# Patient Record
Sex: FEMALE | Race: BLACK OR AFRICAN AMERICAN
[De-identification: names, ages, dates, MRNs, and addresses within clinical notes are randomized per-mention and may not be internally consistent; named-entity substitution may affect disease eponyms.]

---

## 2017-01-13 ENCOUNTER — HOSPITAL ENCOUNTER (EMERGENCY)
Dept: HOSPITAL 17 - NEPA | Age: 22
Discharge: HOME | End: 2017-01-13
Payer: COMMERCIAL

## 2017-01-13 VITALS
DIASTOLIC BLOOD PRESSURE: 70 MMHG | HEART RATE: 78 BPM | OXYGEN SATURATION: 100 % | TEMPERATURE: 97.8 F | SYSTOLIC BLOOD PRESSURE: 124 MMHG | RESPIRATION RATE: 16 BRPM

## 2017-01-13 VITALS
HEART RATE: 75 BPM | SYSTOLIC BLOOD PRESSURE: 131 MMHG | DIASTOLIC BLOOD PRESSURE: 59 MMHG | OXYGEN SATURATION: 99 % | RESPIRATION RATE: 16 BRPM

## 2017-01-13 VITALS — HEIGHT: 61 IN | BODY MASS INDEX: 19.98 KG/M2 | WEIGHT: 105.82 LBS

## 2017-01-13 DIAGNOSIS — N39.0: Primary | ICD-10-CM

## 2017-01-13 DIAGNOSIS — B96.20: ICD-10-CM

## 2017-01-13 LAB
BACTERIA #/AREA URNS HPF: (no result) /HPF
COLOR UR: YELLOW
COMMENT (UR): (no result)
CULTURE IF INDICATED: (no result)
GLUCOSE UR STRIP-MCNC: (no result) MG/DL
HGB UR QL STRIP: (no result)
KETONES UR STRIP-MCNC: (no result) MG/DL
MUCOUS THREADS #/AREA URNS LPF: (no result) /LPF
NITRITE UR QL STRIP: (no result)
SP GR UR STRIP: 1.02 (ref 1–1.03)
SQUAMOUS #/AREA URNS HPF: 2 /HPF (ref 0–5)

## 2017-01-13 PROCEDURE — 81001 URINALYSIS AUTO W/SCOPE: CPT

## 2017-01-13 PROCEDURE — 87186 SC STD MICRODIL/AGAR DIL: CPT

## 2017-01-13 PROCEDURE — 84703 CHORIONIC GONADOTROPIN ASSAY: CPT

## 2017-01-13 PROCEDURE — 87077 CULTURE AEROBIC IDENTIFY: CPT

## 2017-01-13 PROCEDURE — 87086 URINE CULTURE/COLONY COUNT: CPT

## 2017-01-13 PROCEDURE — 99283 EMERGENCY DEPT VISIT LOW MDM: CPT

## 2017-01-13 NOTE — PD
HPI


Chief Complaint:   Complaint


Time Seen by Provider:  10:00


Travel History


International Travel<30 days:  No


Contact w/Intl Traveler<30days:  No


Traveled to known affect area:  No





History of Present Illness


HPI


Patient is a 21-year-old female who presents to emergency room with complaints 

of possible urinary tract infection.  She reports that for the past 4 days, she 

has had increased dysuria, urinary urgency and frequency.  Patient reports that 

she try to flush out her kidneys by drinking plenty of fluids, reports that she 

is still symptomatic.  Patient reports that symptoms are worse today, and 

reports increased dysuria with urgency today.  Patient reports that she has had 

history of urinary tract infections in the past, patient reports the symptoms 

feel exactly the same as when she was briefly diagnosed with urinary tract 

infection.  Patient with no fevers or chills.  Patient with no abdominal pain.  

Patient with no nausea or vomiting.  Patient denies vaginal discharge or 

bleeding this time.





PFSH


Past Medical History


Medical History:  Denies Significant Hx


Asthma:  Yes


Diminished Hearing:  No


Respiratory:  Yes (ASTHMA)


Tetanus Vaccination:  < 5 Years


Pregnant?:  Unknown


LMP:  UNKNOWN


:  0





Past Surgical History


Surgical History:  No Previous Surgery





Family History


Family History:  Negative





Social History


Alcohol Use:  No


Tobacco Use:  No


Substance Use:  No





Allergies-Medications


(Allergen,Severity, Reaction):  


Coded Allergies:  


     No Known Allergies (Unverified , 17)


Reported Meds & Prescriptions





Reported Meds & Active Scripts


Active


No Active Prescriptions or Reported Medications    








Review of Systems


General / Constitutional:  No: Fever


Eyes:  No: Visual changes


HENT:  No: Headaches


Cardiovascular:  No: Chest Pain or Discomfort


Respiratory:  No: Shortness of Breath


Gastrointestinal:  No: Nausea, Vomiting, Diarrhea, Abdominal Pain


Genitourinary:  Positive: Urgency, Frequency, Dysuria, Hematuria,  No: Hesitancy

, Pelvic Pain, Flank Pain, Discharge, Vaginal Bleeding


Musculoskeletal:  No: Pain


Skin:  No Rash


Neurologic:  No: Weakness


Psychiatric:  No: Depression


Endocrine:  No: Polydipsia


Hematologic/Lymphatic:  No: Easy Bruising





Physical Exam


Narrative


GENERAL: Well-appearing, no acute distress


SKIN: Warm and dry.


HEAD: Atraumatic. Normocephalic. 


ENT: Mucous membranes pink and moist.


NECK: Trachea midline. No JVD. 


CARDIOVASCULAR: Regular rate and rhythm.  No murmur appreciated.


RESPIRATORY: No accessory muscle use. Clear to auscultation. Breath sounds 

equal bilaterally. 


GASTROINTESTINAL: Abdomen soft, non-tender, nondistended. Hepatic and splenic 

margins not palpable. 


MUSCULOSKELETAL: No obvious deformities. No clubbing.  No cyanosis.  No edema.





Data


Data


Last Documented VS





Vital Signs








  Date Time  Temp Pulse Resp B/P Pulse Ox O2 Delivery O2 Flow Rate FiO2


 


17 09:51  75 16 131/59 99   


 


17 09:29 97.8     Room Air  








Orders





 Urinalysis - C+S If Indicated (17 09:53)


Ed Urine Pregnancytest Poc (17 09:53)


Urine Culture (17 10:10)





Labs








 Laboratory Tests








Test 17





 10:10


 


Urine Color YELLOW 


 


Urine Turbidity HAZY 


 


Urine pH 6.5 


 


Urine Specific Gravity 1.018 


 


Urine Protein 30 mg/dL


 


Urine Glucose (UA) NEG mg/dL


 


Urine Ketones NEG mg/dL


 


Urine Occult Blood LARGE 


 


Urine Nitrite NEG 


 


Urine Bilirubin NEG 


 


Urine Urobilinogen LESS THAN 2.0





 MG/DL


 


Urine Leukocyte Esterase LARGE 


 


Urine RBC  /hpf


 


Urine WBC  /hpf


 


Urine Squamous Epithelial 2 /hpf





Cells 


 


Urine Bacteria RARE /hpf


 


Urine Mucus FEW /lpf


 


Microscopic Urinalysis Comment CULTURE





 INDICATED














MDM


Medical Decision Making


Medical Screen Exam Complete:  Yes


Emergency Medical Condition:  Yes


Interpretation(s)





Vital Signs








  Date Time  Temp Pulse Resp B/P Pulse Ox O2 Delivery O2 Flow Rate FiO2


 


17 09:51  75 16 131/59 99   


 


17 09:29 97.8 78 16 124/70 100 Room Air  








Differential Diagnosis


UTI, cervicitis


Narrative Course


21-year-old female who presents to emergency room for evaluation of possible 

UTI.  Patient reports that she has been having increased dysuria, urinary 

urgency and frequency for the past 4 days.  Patient reports that symptoms have 

been getting worse over the past couple days, patient reports that symptoms 

feel similar to when she's had urinary tract infections in the past.  Patient 

here to see she has a urinary tract infection.  Patient with no vaginal 

discharge or vaginal pain or abdominal pain at this time.





UA ordered for evaluation of possible urinary tract infection.





Patient with UTI and UA, will start patient on Macrobid.  Discussed with 

patient need to follow up with cultures from today.  Patient will follow-up 

with primary doctor and return to ER as needed.





Diagnosis





 Primary Impression:  


 UTI (urinary tract infection)


 Qualified Code:  N30.01 - Acute cystitis with hematuria


Patient Instructions:  General Instructions





***Additional Instructions:


Please take all antibiotics as prescribed.





Please follow-up with primary care doctor and return to ER as needed





Please follow-up with all CULTURES from today.


***Med/Other Pt SpecificInfo:  Prescription(s) given


Scripts


Nitrofurantoin Monohydrate Macrocrystals (Macrobid)100 Mg Dpi504 Mg PO BID  10 

Days  Ref 0


   Prov:Katty Patel DO         17


Disposition:  01 DISCHARGE HOME


Condition:  Stable








Katty Patel DO 2017 10:07

## 2017-01-17 ENCOUNTER — HOSPITAL ENCOUNTER (OUTPATIENT)
Dept: HOSPITAL 17 - NEPA | Age: 22
Setting detail: OBSERVATION
LOS: 1 days | Discharge: HOME | End: 2017-01-18
Attending: HOSPITALIST | Admitting: HOSPITALIST
Payer: COMMERCIAL

## 2017-01-17 VITALS — BODY MASS INDEX: 19.98 KG/M2 | WEIGHT: 105.82 LBS | HEIGHT: 61 IN

## 2017-01-17 VITALS
DIASTOLIC BLOOD PRESSURE: 62 MMHG | TEMPERATURE: 98.4 F | OXYGEN SATURATION: 100 % | HEART RATE: 127 BPM | RESPIRATION RATE: 22 BRPM | SYSTOLIC BLOOD PRESSURE: 109 MMHG

## 2017-01-17 VITALS — OXYGEN SATURATION: 98 %

## 2017-01-17 VITALS
RESPIRATION RATE: 20 BRPM | TEMPERATURE: 98.9 F | SYSTOLIC BLOOD PRESSURE: 111 MMHG | OXYGEN SATURATION: 100 % | DIASTOLIC BLOOD PRESSURE: 61 MMHG | HEART RATE: 140 BPM

## 2017-01-17 VITALS — HEART RATE: 108 BPM | OXYGEN SATURATION: 97 % | RESPIRATION RATE: 34 BRPM

## 2017-01-17 VITALS — OXYGEN SATURATION: 100 %

## 2017-01-17 DIAGNOSIS — R11.10: ICD-10-CM

## 2017-01-17 DIAGNOSIS — E86.0: ICD-10-CM

## 2017-01-17 DIAGNOSIS — J02.9: ICD-10-CM

## 2017-01-17 DIAGNOSIS — R50.9: ICD-10-CM

## 2017-01-17 DIAGNOSIS — R00.0: ICD-10-CM

## 2017-01-17 DIAGNOSIS — R73.9: ICD-10-CM

## 2017-01-17 DIAGNOSIS — R05: ICD-10-CM

## 2017-01-17 DIAGNOSIS — J45.901: Primary | ICD-10-CM

## 2017-01-17 LAB
ALP SERPL-CCNC: 65 U/L (ref 45–117)
ALT SERPL-CCNC: 26 U/L (ref 10–53)
ANION GAP SERPL CALC-SCNC: 9 MEQ/L (ref 5–15)
AST SERPL-CCNC: 20 U/L (ref 15–37)
BASOPHILS # BLD AUTO: 0 TH/MM3 (ref 0–0.2)
BASOPHILS NFR BLD: 0.7 % (ref 0–2)
BILIRUB SERPL-MCNC: 0.7 MG/DL (ref 0.2–1)
BLOOD GAS VENOUS BASE EXCESS: -3.5 MMOL/L (ref -2–2)
BLOOD GAS VENOUS HCO3: 19 MMOL/L (ref 22–26)
BLOOD GAS VENOUS PCO2: 26 MMHG (ref 44–48)
BLOOD GAS VENOUS PH: 7.49 (ref 7.36–7.4)
BLOOD GAS VENOUS PO2: 47 MMHG (ref 35–40)
BUN SERPL-MCNC: 9 MG/DL (ref 7–18)
CD3+CD4+ CELLS # BLD: 82 % (ref 70–76)
CHLORIDE SERPL-SCNC: 104 MEQ/L (ref 98–107)
CRITICAL VALUE: YES
EOSINOPHIL # BLD: 0.5 TH/MM3 (ref 0–0.4)
EOSINOPHIL NFR BLD: 6.6 % (ref 0–4)
ERYTHROCYTE [DISTWIDTH] IN BLOOD BY AUTOMATED COUNT: 12.9 % (ref 11.6–17.2)
GFR SERPLBLD BASED ON 1.73 SQ M-ARVRAT: 94 ML/MIN (ref 89–?)
HCO3 BLD-SCNC: 22.6 MEQ/L (ref 21–32)
HCT VFR BLD CALC: 40.6 % (ref 35–46)
HEMO FLAGS: (no result)
LYMPHOCYTES # BLD AUTO: 1.7 TH/MM3 (ref 1–4.8)
LYMPHOCYTES NFR BLD AUTO: 25.2 % (ref 9–44)
MCH RBC QN AUTO: 30.2 PG (ref 27–34)
MCHC RBC AUTO-ENTMCNC: 34.2 % (ref 32–36)
MCV RBC AUTO: 88.3 FL (ref 80–100)
METHAMPHET UR QL: 15.6 VOL % (ref 9–17)
MONOCYTES NFR BLD: 13 % (ref 0–8)
NEUTROPHILS # BLD AUTO: 3.8 TH/MM3 (ref 1.8–7.7)
NEUTROPHILS NFR BLD AUTO: 54.5 % (ref 16–70)
O2/TOTAL GAS SETTING VFR VENT: 21 %
OXYGEN DEVICE: (no result)
PLATELET # BLD: 277 TH/MM3 (ref 150–450)
POTASSIUM SERPL-SCNC: 3.8 MEQ/L (ref 3.5–5.1)
RBC # BLD AUTO: 4.6 MIL/MM3 (ref 4–5.3)
SODIUM SERPL-SCNC: 136 MEQ/L (ref 136–145)
STAT: NO
TEMP CORR TO: 98.6
WBC # BLD AUTO: 6.9 TH/MM3 (ref 4–11)

## 2017-01-17 PROCEDURE — 94640 AIRWAY INHALATION TREATMENT: CPT

## 2017-01-17 PROCEDURE — 82805 BLOOD GASES W/O2 SATURATION: CPT

## 2017-01-17 PROCEDURE — 85379 FIBRIN DEGRADATION QUANT: CPT

## 2017-01-17 PROCEDURE — 80048 BASIC METABOLIC PNL TOTAL CA: CPT

## 2017-01-17 PROCEDURE — G0378 HOSPITAL OBSERVATION PER HR: HCPCS

## 2017-01-17 PROCEDURE — 96374 THER/PROPH/DIAG INJ IV PUSH: CPT

## 2017-01-17 PROCEDURE — 80053 COMPREHEN METABOLIC PANEL: CPT

## 2017-01-17 PROCEDURE — 99285 EMERGENCY DEPT VISIT HI MDM: CPT

## 2017-01-17 PROCEDURE — 71010: CPT

## 2017-01-17 PROCEDURE — 85025 COMPLETE CBC W/AUTO DIFF WBC: CPT

## 2017-01-17 PROCEDURE — 96375 TX/PRO/DX INJ NEW DRUG ADDON: CPT

## 2017-01-17 PROCEDURE — 96361 HYDRATE IV INFUSION ADD-ON: CPT

## 2017-01-17 PROCEDURE — 94664 DEMO&/EVAL PT USE INHALER: CPT

## 2017-01-17 RX ADMIN — IPRATROPIUM BROMIDE AND ALBUTEROL SULFATE SCH AMPULE: .5; 3 SOLUTION RESPIRATORY (INHALATION) at 18:45

## 2017-01-17 RX ADMIN — IPRATROPIUM BROMIDE AND ALBUTEROL SULFATE SCH AMPULE: .5; 3 SOLUTION RESPIRATORY (INHALATION) at 18:44

## 2017-01-17 RX ADMIN — IPRATROPIUM BROMIDE AND ALBUTEROL SULFATE SCH AMPULE: .5; 3 SOLUTION RESPIRATORY (INHALATION) at 18:42

## 2017-01-17 NOTE — PD
HPI


Chief Complaint:  Respiratory Distress


Time Seen by Provider:  18:37


Travel History


International Travel<30 days:  No


Contact w/Intl Traveler<30days:  No


Traveled to known affect area:  No





History of Present Illness


HPI


Patient is a 21-year-old female with a history of asthma presents to the 

emergency department with shortness of breath.  Patient states she's been 

increasing shortness of breath over the past 2 days and is at a history of 

upper respiratory symptoms including cough and congestion.  Denies any fevers.  

Patient does state that she gets Depo-Provera shot for birth control.  Denies 

any long trips recently.  On arrival patient is able to speak in only one or 2 

words at a time very dyspneic appearing.





PFSH


Past Medical History


Asthma:  Yes


Diminished Hearing:  No


Respiratory:  Yes (ASTHMA)


Tetanus Vaccination:  < 5 Years


Influenza Vaccination:  Yes


Pregnant?:  Not Pregnant


LMP:  DEPO


:  0





Past Surgical History


Surgical History:  No Previous Surgery





Social History


Alcohol Use:  No


Tobacco Use:  No


Substance Use:  No





Allergies-Medications


(Allergen,Severity, Reaction):  


Coded Allergies:  


     No Known Allergies (Unverified , 17)


Reported Meds & Prescriptions





Reported Meds & Active Scripts


Active


Macrobid (Nitrofurantoin Monoh/Nitrofur Macro) 100 Mg Cap 100 Mg PO BID 10 Days








Review of Systems


Except as stated in HPI:  all other systems reviewed are Neg





Physical Exam


Narrative


GENERAL: Well-developed well-nourished in obvious respiratory distress.  She is 

however protecting her own airway.  Mostly nods and shakes her head stance are 

questions.


SKIN: Warm and dry.


HEAD: Atraumatic. Normocephalic. 


EYES: Pupils equal and round. No scleral icterus. No injection or drainage. 


ENT: No nasal bleeding or discharge.  Mucous membranes pink and moist.


NECK: Trachea midline. No JVD. 


CARDIOVASCULAR: Regular rate with tachycardia..  No murmur appreciated.  2+ 

bilateral equal pulses in all 4 cavities.


RESPIRATORY: Intercostal and supraclavicular retractions, very faint breath 

sounds throughout her lung fields, inspiratory and expiratory wheezing.


GASTROINTESTINAL: Abdomen soft, non-tender, nondistended. Hepatic and splenic 

margins not palpable. 


MUSCULOSKELETAL: No obvious deformities. No clubbing.  No cyanosis.  No edema. 


NEUROLOGICAL: Awake and alert. No obvious cranial nerve deficits.  Motor 

grossly within normal limits. Normal speech.


PSYCHIATRIC: Appropriate mood and affect; insight and judgment normal.





Data


Data


Last Documented VS





Vital Signs








  Date Time  Temp Pulse Resp B/P Pulse Ox O2 Delivery O2 Flow Rate FiO2


 


17 21:49 98.9 140 20 111/61 100 Nasal Cannula 2 








Orders





 Complete Blood Count With Diff (17 18:37)


Comprehensive Metabolic Panel (17 18:37)


Chest, Single Ap (17 18:37)


Ecg Monitoring (17 18:37)


Iv Access Insert/Monitor (17 18:37)


Oximetry (17 18:37)


Oxygen Administration (17 18:37)


Methylprednisolone So Succ Inj (Solumedr (17 18:45)


Albuterol-Ipratropium Neb (Duoneb Neb) (17 18:45)


Sodium Chloride 0.9% Flush (Ns Flush) (17 18:45)


Albuterol-Ipratropium Neb (Duoneb Neb) (17 18:39)


D-Dimer (17 18:50)


Blood Gas Venous (Vbg) (17 18:50)


Lorazepam Inj (Ativan Inj) (17 20:00)


Sodium Chlor 0.9% 1000 Ml Inj (Ns 1000 M (17 20:30)


Albuterol-Ipratropium Neb (Duoneb Neb) (17 20:30)


Albuterol Hfa Inh (Proair Hfa Inh) (17 20:30)


Spacer/Device For Mdi (Inspirease For Md (17 20:30)


Admit Order (Ed Use Only) (17 )





Labs





 Laboratory Tests








Test 17





 18:40 19:35


 


White Blood Count 6.9 TH/MM3 


 


Red Blood Count 4.60 MIL/MM3 


 


Hemoglobin 13.9 GM/DL 


 


Hematocrit 40.6 % 


 


Mean Corpuscular Volume 88.3 FL 


 


Mean Corpuscular Hemoglobin 30.2 PG 


 


Mean Corpuscular Hemoglobin 34.2 % 





Concent  


 


Red Cell Distribution Width 12.9 % 


 


Platelet Count 277 TH/MM3 


 


Mean Platelet Volume 9.5 FL 


 


Neutrophils (%) (Auto) 54.5 % 


 


Lymphocytes (%) (Auto) 25.2 % 


 


Monocytes (%) (Auto) 13.0 % 


 


Eosinophils (%) (Auto) 6.6 % 


 


Basophils (%) (Auto) 0.7 % 


 


Neutrophils # (Auto) 3.8 TH/MM3 


 


Lymphocytes # (Auto) 1.7 TH/MM3 


 


Monocytes # (Auto) 0.9 TH/MM3 


 


Eosinophils # (Auto) 0.5 TH/MM3 


 


Basophils # (Auto) 0.0 TH/MM3 


 


CBC Comment DIFF FINAL  


 


Differential Comment   


 


D-Dimer Quantitative (PE/DVT) LESS THAN 0.19 





 MG/L FEU 


 


Sodium Level 136 MEQ/L 


 


Potassium Level 3.8 MEQ/L 


 


Chloride Level 104 MEQ/L 


 


Carbon Dioxide Level 22.6 MEQ/L 


 


Anion Gap 9 MEQ/L 


 


Blood Urea Nitrogen 9 MG/DL 


 


Creatinine 0.90 MG/DL 


 


Estimat Glomerular Filtration 94 ML/MIN 





Rate  


 


Random Glucose 89 MG/DL 


 


Calcium Level 9.2 MG/DL 


 


Total Bilirubin 0.7 MG/DL 


 


Aspartate Amino Transf 20 U/L 





(AST/SGOT)  


 


Alanine Aminotransferase 26 U/L 





(ALT/SGPT)  


 


Alkaline Phosphatase 65 U/L 


 


Total Protein 8.5 GM/DL 


 


Albumin 3.9 GM/DL 


 


Blood Gas Puncture Site   


 


Blood Gas Patient Temperature  98.6 


 


Venous Blood pH  7.49 


 


Venous Blood Partial Pressure  26 mmHg





CO2  


 


Venous Blood Partial Pressure  47 mmHg





O2  


 


Venous Blood HCO3  19 mmol/L


 


Venous Blood Oxygen Saturation  82 %


 


Venous Blood Oxygen Content  15.6 Vol %


 


Venous Blood Base Excess  -3.5 mmol/L


 


Oxygen Delivery Device  ROOM AIR 


 


Blood Gas Inspired Oxygen  21 %











MDM


Medical Decision Making


Medical Screen Exam Complete:  Yes


Emergency Medical Condition:  Yes


Differential Diagnosis


TE, asthma exacerbation, pneumonia


Narrative Course


Patient was roomed in the emergency department, initially able to nod and shake 

her head only to answer questions was given DuoNeb 3 treatments as well as Solu

-Medrol 125 mg IV.  She began to be able to speak first in short phrases and 

then over time able to speak in full sentences.  Her lung sounds improved 

significantly and now are clear with good air entry throughout all lung fields.

  Her saturation was maintained 100% on 2 L nasal cannula.  She was given an 

additional 3 times DuoNeb.  Chest x-ray was negative, d-dimer negative.  Labs 

are reassuring.  Patient remained tachycardic in the 120s to 140s.  She was 

given a half a milligram of Ativan after her blood gas suggested respiratory 

alkalosis.  This made her dizzy and woozy without decreasing her heart rate.  

She fluid challenge with a liter of fluid which also did not significant 

improve her heart rate.  After an observation period of 3 hours in the 

emergency department it was determined that she would benefit from overnight 

observation given her level of tachycardia remaining after significant time 

since her last DuoNeb treatments.  Discussed with Dr. Vargas who is agreeable.





Diagnosis





 Primary Impression:  


 Asthma exacerbation


 Additional Impression:  


 Tachycardia





Admitting Information


Admitting Physician Requests:  Observation


Condition:  Stable








Freddy Vergara MD 2017 22:53

## 2017-01-17 NOTE — RADRPT
EXAM DATE/TIME:  01/17/2017 18:59 

 

[facility] COMPARISON:     

No previous studies available for comparison.

 

                     

INDICATIONS :     

Respiratory distress for 2 days due to asthma.

                     

 

MEDICAL HISTORY :            

Asthma.   

 

SURGICAL HISTORY :     

None.   

 

ENCOUNTER:     

Initial                                        

 

ACUITY:     

2 days      

 

PAIN SCORE:     

2/10

 

LOCATION:     

Bilateral chest 

 

FINDINGS:     

A single view of the chest demonstrates the lungs to be symmetrically aerated without evidence of mas
s, infiltrate or effusion.  The cardiomediastinal contours are unremarkable.  Osseous structures are 
intact.

 

CONCLUSION:     No acute disease.  

 

 

 

 Yuval Montalvo MD FACR on January 17, 2017 at 19:11           

Board Certified Radiologist.

 This report was verified electronically.

## 2017-01-18 VITALS
SYSTOLIC BLOOD PRESSURE: 108 MMHG | DIASTOLIC BLOOD PRESSURE: 62 MMHG | HEART RATE: 89 BPM | RESPIRATION RATE: 22 BRPM | TEMPERATURE: 97.8 F

## 2017-01-18 VITALS — DIASTOLIC BLOOD PRESSURE: 57 MMHG | SYSTOLIC BLOOD PRESSURE: 105 MMHG

## 2017-01-18 VITALS — HEART RATE: 101 BPM

## 2017-01-18 VITALS
OXYGEN SATURATION: 100 % | SYSTOLIC BLOOD PRESSURE: 115 MMHG | RESPIRATION RATE: 18 BRPM | TEMPERATURE: 98.6 F | HEART RATE: 111 BPM | DIASTOLIC BLOOD PRESSURE: 67 MMHG

## 2017-01-18 VITALS
SYSTOLIC BLOOD PRESSURE: 109 MMHG | TEMPERATURE: 98.5 F | HEART RATE: 96 BPM | DIASTOLIC BLOOD PRESSURE: 55 MMHG | RESPIRATION RATE: 20 BRPM | OXYGEN SATURATION: 99 %

## 2017-01-18 VITALS — HEART RATE: 89 BPM

## 2017-01-18 LAB
ANION GAP SERPL CALC-SCNC: 14 MEQ/L (ref 5–15)
BASOPHILS # BLD AUTO: 0.1 TH/MM3 (ref 0–0.2)
BASOPHILS NFR BLD: 1 % (ref 0–2)
BUN SERPL-MCNC: 5 MG/DL (ref 7–18)
CHLORIDE SERPL-SCNC: 105 MEQ/L (ref 98–107)
EOSINOPHIL # BLD: 0 TH/MM3 (ref 0–0.4)
EOSINOPHIL NFR BLD: 0 % (ref 0–4)
ERYTHROCYTE [DISTWIDTH] IN BLOOD BY AUTOMATED COUNT: 13.2 % (ref 11.6–17.2)
GFR SERPLBLD BASED ON 1.73 SQ M-ARVRAT: 75 ML/MIN (ref 89–?)
HCO3 BLD-SCNC: 19.9 MEQ/L (ref 21–32)
HCT VFR BLD CALC: 34.9 % (ref 35–46)
HEMO FLAGS: (no result)
LYMPHOCYTES # BLD AUTO: 0.3 TH/MM3 (ref 1–4.8)
LYMPHOCYTES NFR BLD AUTO: 4.6 % (ref 9–44)
MCH RBC QN AUTO: 29.5 PG (ref 27–34)
MCHC RBC AUTO-ENTMCNC: 32.7 % (ref 32–36)
MCV RBC AUTO: 90.4 FL (ref 80–100)
MONOCYTES NFR BLD: 1 % (ref 0–8)
NEUTROPHILS # BLD AUTO: 6.1 TH/MM3 (ref 1.8–7.7)
NEUTROPHILS NFR BLD AUTO: 93.4 % (ref 16–70)
PLATELET # BLD: 225 TH/MM3 (ref 150–450)
POTASSIUM SERPL-SCNC: 3.6 MEQ/L (ref 3.5–5.1)
RBC # BLD AUTO: 3.86 MIL/MM3 (ref 4–5.3)
SODIUM SERPL-SCNC: 139 MEQ/L (ref 136–145)
WBC # BLD AUTO: 6.6 TH/MM3 (ref 4–11)

## 2017-01-18 RX ADMIN — PHENYTOIN SODIUM SCH MLS/HR: 50 INJECTION INTRAMUSCULAR; INTRAVENOUS at 08:46

## 2017-01-18 RX ADMIN — IPRATROPIUM BROMIDE SCH MG: 0.5 SOLUTION RESPIRATORY (INHALATION) at 16:00

## 2017-01-18 RX ADMIN — IPRATROPIUM BROMIDE SCH MG: 0.5 SOLUTION RESPIRATORY (INHALATION) at 07:20

## 2017-01-18 RX ADMIN — IPRATROPIUM BROMIDE SCH MG: 0.5 SOLUTION RESPIRATORY (INHALATION) at 03:19

## 2017-01-18 RX ADMIN — METHYLPREDNISOLONE SODIUM SUCCINATE SCH MG: 40 INJECTION, POWDER, FOR SOLUTION INTRAMUSCULAR; INTRAVENOUS at 00:05

## 2017-01-18 RX ADMIN — METHYLPREDNISOLONE SODIUM SUCCINATE SCH MG: 40 INJECTION, POWDER, FOR SOLUTION INTRAMUSCULAR; INTRAVENOUS at 06:04

## 2017-01-18 RX ADMIN — PHENYTOIN SODIUM SCH MLS/HR: 50 INJECTION INTRAMUSCULAR; INTRAVENOUS at 00:04

## 2017-01-18 NOTE — HHI.HP
__________________________________________________





[facility]


Service


[facility]


Primary Care Physician


No Primary Care Physician


Admission Diagnosis


ASTHMA EXACERBATION.


Diagnoses:  


Chief Complaint:  


Shortness of breath


Travel History


International Travel<30 Days:  No


Contact w/Intl Traveler <30 Da:  No


Traveled to Known Affected Are:  No


History of Present Illness


21-year-old female with past medical history of asthma who presented with 

shortness of breath.  The patient states that she's had a cold for the past 4 

days.  She has been having fevers and chills.  Reports a fever of 101 at home.  

She has been having a cough productive with yellow phlegm.  She has associated 

sore throat.  Her abdominal muscles are sore from all the coughing.  No chest 

pain.  She has not had an asthma exacerbation in years.  She has not been on 

any nebulizers or inhalers at home, but recently received a prescription for 

MDI with spacer.  She had an episode of vomiting last night 1.  She is 

tolerating oral intake, but appetite is currently poor.  She had one episodes 

of loose stools last night.  She denies any chest pain or abdominal pain.  She 

was treated for UTI 4 days ago in the ED with Macrobid, symptoms improving.  

Has been on IV steroids overnight and she reports significant improvement, 

nearly at baseline





Review of Systems


Other


10 point review of systems performed and was negative except as stated in the 

history of present illness





Past Family Social History


Past Medical History


Asthma


Past Surgical History


[place] teeth removed


Reported Medications


Currently has albuterol MDI and Macrobid prescriptions from the ED


Allergies:  


Coded Allergies:  


     No Known Allergies (Unverified , 1/13/17)


Active Ordered Medications





 Current Medications








 Medications


  (Trade)  Dose


 Ordered  Sig/Wei


 Route  Start Time


 Stop Time Status Last Admin


 


  (NS 1000 ml Inj)  1,000 ml @ 


 100 mls/hr  Q10H


 IV  1/17/17 22:46


    1/18/17 00:04


 


 


  (NS Flush)  2 ml  UNSCH  PRN


 FLUSH  1/17/17 23:00


     


 


 


  (NS Flush)  2 ml  BID


 FLUSH  1/18/17 09:00


     


 


 


  (Narcan Inj)  0.4 mg  UNSCH  PRN


 IV  1/17/17 23:00


     


 


 


  (SoluMEDROL INJ)  40 mg  Q6HR


 IV PUSH  1/18/17 00:00


    1/18/17 06:04


 


 


  (Protonix)  40 mg  DAILY


 PO  1/18/17 09:00


    1/18/17 09:35


 








Family History


Mother has schizophrenia/bipolar, hypertension, and diabetes


Social History


Use to smoke cigarettes every now and then, none currently


Occasional alcohol use


Denies any drug use





Physical Exam


Vital Signs





 Vital Signs








  Date Time  Temp Pulse Resp B/P Pulse Ox O2 Delivery O2 Flow Rate FiO2


 


1/18/17 08:24 98.5 96 20 109/55 99   


 


1/18/17 04:57  101      


 


1/18/17 02:01 98.6 111 18 115/67 100   


 


1/18/17 01:21  108 18 105/57 99 Nasal Cannula 2 


 


1/17/17 21:49 98.9 140 20 111/61 100 Nasal Cannula 2 


 


1/17/17 20:00     98 Nasal Cannula 2.00 


 


1/17/17 19:26 98.4 127 22 109/62 100 Nasal Cannula 2 


 


1/17/17 18:42     100 Aerosol Mask 9 


 


1/17/17 18:42     96 Aerosol Mask 9 


 


1/17/17 18:41     97 Room Air  


 


1/17/17 18:36  108 34  97   








Physical Exam


GENERAL: Well-developed well-nourished.  In no acute distress.  Appears 

comfortable on room air.


SKIN: Warm and dry. No lesions noted.


HEENT: Normocephalic. Pupils equal and round. Mucous membranes pink and moist. 


CARDIOVASCULAR: Regular rate and rhythm.  No murmur appreciated.


RESPIRATORY: No accessory muscle use. Clear to auscultation. Breath sounds 

equal bilaterally.  No wheezing.


GASTROINTESTINAL: Abdomen soft, non-tender, nondistended. Bowel sounds x4.


MUSCULOSKELETAL: No obvious deformities. No clubbing or cyanosis. No edema. 


NEUROLOGICAL: Awake and alert. No focal neurological deficits.  Moves upper and 

lower extremities spontaneously. Normal speech.


PSYCHIATRIC: Appropriate mood and affect; insight and judgment normal.


Laboratory





Laboratory Tests








Test 1/17/17 1/17/17 1/18/17





 18:40 19:35 03:40


 


White Blood Count 6.9   6.6 


 


Red Blood Count 4.60   3.86 


 


Hemoglobin 13.9   11.4 


 


Hematocrit 40.6   34.9 


 


Mean Corpuscular Volume 88.3   90.4 


 


Mean Corpuscular Hemoglobin 30.2   29.5 


 


Mean Corpuscular Hemoglobin 34.2   32.7 





Concent   


 


Red Cell Distribution Width 12.9   13.2 


 


Platelet Count 277   225 


 


Mean Platelet Volume 9.5   9.7 


 


Neutrophils (%) (Auto) 54.5   93.4 


 


Lymphocytes (%) (Auto) 25.2   4.6 


 


Monocytes (%) (Auto) 13.0   1.0 


 


Eosinophils (%) (Auto) 6.6   0.0 


 


Basophils (%) (Auto) 0.7   1.0 


 


Neutrophils # (Auto) 3.8   6.1 


 


Lymphocytes # (Auto) 1.7   0.3 


 


Monocytes # (Auto) 0.9   0.1 


 


Eosinophils # (Auto) 0.5   0.0 


 


Basophils # (Auto) 0.0   0.1 


 


CBC Comment DIFF FINAL   DIFF FINAL 


 


Differential Comment     


 


D-Dimer Quantitative (PE/DVT) LESS THAN 0.19   


 


Sodium Level 136   139 


 


Potassium Level 3.8   3.6 


 


Chloride Level 104   105 


 


Carbon Dioxide Level 22.6   19.9 


 


Anion Gap 9   14 


 


Blood Urea Nitrogen 9   5 


 


Creatinine 0.90   1.09 


 


Estimat Glomerular Filtration 94   75 





Rate   


 


Random Glucose 89   273 


 


Calcium Level 9.2   8.0 


 


Total Bilirubin 0.7   


 


Aspartate Amino Transf 20   





(AST/SGOT)   


 


Alanine Aminotransferase 26   





(ALT/SGPT)   


 


Alkaline Phosphatase 65   


 


Total Protein 8.5   


 


Albumin 3.9   


 


Blood Gas Puncture Site    


 


Blood Gas Patient Temperature  98.6  


 


Venous Blood pH  7.49  


 


Venous Blood Partial Pressure  26  





CO2   


 


Venous Blood Partial Pressure  47  





O2   


 


Venous Blood HCO3  19  


 


Venous Blood Oxygen Saturation  82  


 


Venous Blood Oxygen Content  15.6  


 


Venous Blood Base Excess  -3.5  


 


Oxygen Delivery Device  ROOM AIR  


 


Blood Gas Inspired Oxygen  21  








Result Diagram:  


1/18/17 0340                                                                   

             1/18/17 0340





Imaging





Last Impressions








Chest X-Ray 1/17/17 1837 Signed





Impressions: 





 Service Date/Time:  Tuesday, January 17, 2017 18:59 - CONCLUSION: No acute 





 disease.       Yuval Montalvo MD  FACR











Assessment and Plan


Problem List:  


(1) Asthma exacerbation


ICD Code:  J45.901


Status:  Acute


Assessment and Plan


21-year-old female with past medical history of asthma who presented with 

shortness of breath





Asthma exacerbation: Improved with IV steroids, transition to by mouth taper.  

Continue ipratropium nebs scheduled and as needed.  Continue albuterol MDI.





URI/bronchitis: Chest x-ray reviewed and unremarkable.  D-dimer negative.  

Likely triggered asthma exacerbation as above.  With high fevers and asthma 

exacerbation, will treat with oral Levaquin.





Dehydration: Creatinine 1.09, likely secondary to poor oral intake from 

infection as above.  IVF bolus.





Hyperglycemia: Random glucose increase from 89 to 273 on IV steroids, expect 

improvement with decrease steroids dose.





Recent UTI: Improving on Macrobid.  Previous culture with Escherichia coli 

sensitive to Macrobid and Cipro.  Stop Macrobid and start Levaquin as above.





DVT prophylaxis: Low risk, early ambulation


GI prophylaxis: Protonix





Disposition: Significantly improved overnight, possible discharge later today 

if patient remains stable and continues to improve.


Discussed Condition With


Patient with SO at bedside, Dr. Smith





Attending Statement


The exam, history, and the medical decision-making described in the above note 

were completed with the assistance of the mid-level provider. I reviewed and 

agree with the findings presented.  I attest that I had a face-to-face 

encounter with the patient on the same day, and personally performed and 

documented my assessment and findings in the medical record.








Galdino Fowler Jan 18, 2017 10:21


Brock Smith MD Feb 6, 2017 03:17

## 2018-05-03 ENCOUNTER — HOSPITAL ENCOUNTER (EMERGENCY)
Dept: HOSPITAL 17 - NEPD | Age: 23
Discharge: HOME | End: 2018-05-03
Payer: SELF-PAY

## 2018-05-03 VITALS
DIASTOLIC BLOOD PRESSURE: 74 MMHG | OXYGEN SATURATION: 100 % | RESPIRATION RATE: 16 BRPM | SYSTOLIC BLOOD PRESSURE: 133 MMHG | HEART RATE: 74 BPM | TEMPERATURE: 98.7 F

## 2018-05-03 DIAGNOSIS — R51: Primary | ICD-10-CM

## 2018-05-03 PROCEDURE — 96372 THER/PROPH/DIAG INJ SC/IM: CPT

## 2018-05-03 PROCEDURE — 99283 EMERGENCY DEPT VISIT LOW MDM: CPT

## 2018-05-03 NOTE — PD
HPI


Chief Complaint:  Headache


Time Seen by Provider:  21:50


Travel History


International Travel<30 days:  No


Contact w/Intl Traveler<30days:  No


Traveled to known affect area:  No





History of Present Illness


HPI


The patient was seen and examined in the presence of the nurse.  She complains 

of headache.  Location is left frontal just above the brow.  No injury.  No 

thunderclap onset.  The headache comes and goes for the last 2 weeks.  No 

fever.  Symptom severity is mild to moderate.  No alleviating factors.  She 

says that she only came because her "mom made her".





PFSH


Past Medical History


Asthma:  Yes


Blood Disorders:  No


Cancer:  No


Cardiovascular Problems:  No


Diminished Hearing:  No


Endocrine:  No


Genitourinary:  No


Immune Disorder:  No


Musculoskeletal:  No


Neurologic:  No


Reproductive:  No


Respiratory:  Yes (ASTHMA)


Pregnant?:  Not Pregnant


LMP:  2018


:  0





Social History


Alcohol Use:  No


Tobacco Use:  No


Substance Use:  No





Allergies-Medications


(Allergen,Severity, Reaction):  


Coded Allergies:  


     No Known Allergies (Unverified  Adverse Reaction, Unknown, 5/3/18)


Reported Meds & Prescriptions





Reported Meds & Active Scripts


Active


Tramadol (Tramadol HCl) 50 Mg Tab 50 Mg PO Q6H PRN


Proair Hfa 8.5 GM Inh (Albuterol Sulfate) 90 Mcg/Act Aer 2 Puff INH Q4-6H PRN


     108 mcg/actuation


Prednisone (21) 5 mg tab Dose Pack (Prednisone) 5 Mg Dspk 5 Mg PO AS DIRECTED


Levaquin (Levofloxacin) 750 Mg Tab 750 Mg PO Q24H








Review of Systems


General / Constitutional:  No: Fever


HENT:  Positive: Headaches


Cardiovascular:  No: Chest Pain or Discomfort





Physical Exam


Narrative


GENERAL: Well-nourished, well-developed patient in no apparent distress.


SKIN: Focused skin assessment reveals no rash and nodules. Skin is Warm and dry.


HEAD: Atraumatic. Normocephalic. 


EYES: Pupils equal and round. No scleral icterus. No injection or drainage. 


ENT: No nasal bleeding or discharge.  Mucous membranes pink and moist.


NECK: Trachea midline. No JVD. 


CARDIOVASCULAR: Regular rate and rhythm.  No murmur appreciated.


RESPIRATORY: No accessory muscle use. Clear to auscultation. Breath sounds 

equal bilaterally. 


GASTROINTESTINAL: Abdomen soft, non-tender, nondistended. Hepatic and splenic 

margins not palpable. 


MUSCULOSKELETAL: No obvious deformities. No clubbing.  No cyanosis.  No edema. 


NEUROLOGICAL: Awake and alert. No obvious cranial nerve deficits.  Motor 

grossly within normal limits. Normal speech.


PSYCHIATRIC: Appropriate mood and affect; insight and judgment normal.





Data


Data


Last Documented VS





Vital Signs








  Date Time  Temp Pulse Resp B/P (MAP) Pulse Ox O2 Delivery O2 Flow Rate FiO2


 


5/3/18 21:26 98.7 74 16 133/74 (93) 100   








Orders





 Orders


Ketorolac Inj (Toradol Inj) (5/3/18 22:15)








[facility]


Medical Decision Making


Medical Screen Exam Complete:  Yes


Emergency Medical Condition:  Yes


Medical Record Reviewed:  Yes


Differential Diagnosis


Differential diagnosis includes migraine, tension headache, cluster headache, 

meningitis.


Narrative Course


I have reviewed the patient's electronic medical record.  She was here for 

headaches in 





Patient looks clinically well with normal neurologic exam.  No red flags to 

suggest emergent imaging is indicated


I gave her Toradol injection





I gave her a prescription for a few tramadol


The patient was advised to follow up with their physician and return if they 

worsen.





Diagnosis





 Primary Impression:  


 Headache


 Qualified Codes:  R51 - Headache





***Additional Instructions:  


The patient was advised to follow up with their physician and return if they 

worsen.


The patient was warned about potential sedation for the medications they will 

receive on prescription.


***Med/Other Pt SpecificInfo:  Prescription(s) given


Scripts


Tramadol (Tramadol) 50 Mg Tab


50 MG PO Q6H Y for PAIN, #12 TAB 0 Refills


   Prov: Kishor Tobias MD         5/3/18


Disposition:  01 DISCHARGE HOME


Condition:  Stable











Kishor Tobias MD May 3, 2018 23:07